# Patient Record
(demographics unavailable — no encounter records)

---

## 2025-02-26 NOTE — CONSULT LETTER
[Dear  ___] : Dear  [unfilled], [Please see my note below.] : Please see my note below. [Sincerely,] : Sincerely, [FreeTextEntry1] : Thank you for sending  LORNE DE OLIVEIRA  to me for neurological evaluation. This is an initial encounter with a new pt. [FreeTextEntry3] : Dr Sethi

## 2025-02-26 NOTE — DISCUSSION/SUMMARY
[FreeTextEntry1] : Vasovagal syncope. Will get EEG. RTO prn. Rx written for chloral hydrate 1500 mg with 1 refill. Note sent to Dr Khan(PCP). Total clinician time spent on 2/26/2025 is 49 minutes including preparing to see the patient, obtaining and/or reviewing and confirming history, performing a medically necessary and appropriate examination, counseling and educating the patient and/or family, documenting clinical information in the EHR and communicating and/or referring to other healthcare professionals.

## 2025-02-26 NOTE — HISTORY OF PRESENT ILLNESS
[FreeTextEntry1] : 3 year old female with 2 spells of syncope in the past year. In August 2024 the pt vomited, then collapsed, limp and unresponsive with eyes closed for 1 minute, pale appearance, drowsy after. Seen at [facility] ER that night. Cardiac evaluation detected an innocent arrhythmia for which she is being monitored. Second syncopal spell occurred 3 weeks ago, same sequence of symptoms. No gaze deviation, tonic or clonic activity. PMH othewise -ve. On no meds. NKA. FMH +ve for syncope in father and brother as a child. No FMH of epilepsy. Birth: FTNSVD no complications. Walked and talked on time.

## 2025-02-26 NOTE — PHYSICAL EXAM
[FreeTextEntry1] : Alert, NAD. Heart sounds NL. Neck FROM. PERRL, EOMI, face symmetric, hearing grossly intact. Tone, power, gait NL. No nystagmus or tremor.